# Patient Record
(demographics unavailable — no encounter records)

---

## 2024-10-30 NOTE — HISTORY OF PRESENT ILLNESS
[Mother] : mother [Breast milk] : breast milk [Formula ___ oz/feed] : [unfilled] oz of formula per feed [Normal] : Normal [Frequency of stools: ___] : Frequency of stools: [unfilled]  stools [In Bassinet/Crib] : sleeps in bassinet/crib [On back] : sleeps on back [Sleeps 12-16 hours per 24 hours (including naps)] : sleeps 12-16 hours per 24 hours (including naps) [Tummy time] : tummy time [No] : No cigarette smoke exposure [Rear facing car seat in back seat] : Rear facing car seat in back seat [Carbon Monoxide Detectors] : Carbon monoxide detectors at home [Smoke Detectors] : Smoke detectors at home. [NO] : No [Co-sleeping] : no co-sleeping [Loose bedding, pillow, toys, and/or bumpers in crib] : no loose bedding, pillow, toys, and/or bumpers in crib [Exposure to electronic nicotine delivery system] : No exposure to electronic nicotine delivery system [de-identified] : Taking 6 oz at a time; reflux has improved on the AR [FreeTextEntry3] : Sleeps between 9pm-5am, one bottle and then back to sleep.

## 2024-10-30 NOTE — PHYSICAL EXAM
[Alert] : alert [Crying] : crying [Normocephalic] : normocephalic [Flat Open Anterior [place]] : flat open anterior fontanelle [Red Reflex] : red reflex bilateral [PERRL] : PERRL [Normally Placed Ears] : normally placed ears [Auricles Well Formed] : auricles well formed [Clear Tympanic membranes] : clear tympanic membranes [Light reflex present] : light reflex present [Bony landmarks visible] : bony landmarks visible [Nares Patent] : nares patent [Palate Intact] : palate intact [Uvula Midline] : uvula midline [Symmetric Chest Rise] : symmetric chest rise [Clear to Auscultation Bilaterally] : clear to auscultation bilaterally [Regular Rate and Rhythm] : regular rate and rhythm [S1, S2 present] : S1, S2 present [+2 Femoral Pulses] : (+) 2 femoral pulses [Soft] : soft [Bowel Sounds] : bowel sounds present [Circumcised] : circumcised [Central Urethral Opening] : central urethral opening [Testicles Descended] : testicles descended bilaterally [Patent] : patent [Normally Placed] : normally placed [No Abnormal Lymph Nodes Palpated] : no abnormal lymph nodes palpated [Startle Reflex] : startle reflex present [Plantar Grasp] : plantar grasp reflex present [Symmetric Bryan] : symmetric bryan [Dermal Melanocytosis] : Dermal Melanocytosis [Acute Distress] : no acute distress [Discharge] : no discharge [Palpable Masses] : no palpable masses [Murmurs] : no murmurs [Tender] : nontender [Distended] : nondistended [Hepatomegaly] : no hepatomegaly [Splenomegaly] : no splenomegaly [Silva-Ortolani] : negative Silva-Ortolani [Allis Sign] : negative Allis sign [Spinal Dimple] : no spinal dimple [Tuft of Hair] : no tuft of hair [Rash or Lesions] : no rash/lesions [FreeTextEntry9] : Umbilical hernia reducible

## 2024-10-30 NOTE — PHYSICAL EXAM
[Alert] : alert [Crying] : crying [Normocephalic] : normocephalic [Flat Open Anterior [place]] : flat open anterior fontanelle [Red Reflex] : red reflex bilateral [PERRL] : PERRL [Normally Placed Ears] : normally placed ears [Auricles Well Formed] : auricles well formed [Clear Tympanic membranes] : clear tympanic membranes [Light reflex present] : light reflex present [Bony landmarks visible] : bony landmarks visible [Nares Patent] : nares patent [Palate Intact] : palate intact [Uvula Midline] : uvula midline [Symmetric Chest Rise] : symmetric chest rise [Clear to Auscultation Bilaterally] : clear to auscultation bilaterally [Regular Rate and Rhythm] : regular rate and rhythm [S1, S2 present] : S1, S2 present [+2 Femoral Pulses] : (+) 2 femoral pulses [Soft] : soft [Bowel Sounds] : bowel sounds present [Circumcised] : circumcised [Central Urethral Opening] : central urethral opening [Testicles Descended] : testicles descended bilaterally [Patent] : patent [Normally Placed] : normally placed [No Abnormal Lymph Nodes Palpated] : no abnormal lymph nodes palpated [Startle Reflex] : startle reflex present [Plantar Grasp] : plantar grasp reflex present [Symmetric Bryan] : symmetric bryan [Dermal Melanocytosis] : Dermal Melanocytosis [Acute Distress] : no acute distress [Discharge] : no discharge [Palpable Masses] : no palpable masses [Murmurs] : no murmurs [Tender] : nontender [Distended] : nondistended [Hepatomegaly] : no hepatomegaly [Splenomegaly] : no splenomegaly [Silva-Ortolani] : negative Silva-Ortolani [Allis Sign] : negative Allis sign [Spinal Dimple] : no spinal dimple [Tuft of Hair] : no tuft of hair [Rash or Lesions] : no rash/lesions [FreeTextEntry9] : Umbilical hernia reducible

## 2024-10-30 NOTE — HISTORY OF PRESENT ILLNESS
[Mother] : mother [Breast milk] : breast milk [Formula ___ oz/feed] : [unfilled] oz of formula per feed [Normal] : Normal [Frequency of stools: ___] : Frequency of stools: [unfilled]  stools [In Bassinet/Crib] : sleeps in bassinet/crib [On back] : sleeps on back [Sleeps 12-16 hours per 24 hours (including naps)] : sleeps 12-16 hours per 24 hours (including naps) [Tummy time] : tummy time [No] : No cigarette smoke exposure [Rear facing car seat in back seat] : Rear facing car seat in back seat [Carbon Monoxide Detectors] : Carbon monoxide detectors at home [Smoke Detectors] : Smoke detectors at home. [NO] : No [Co-sleeping] : no co-sleeping [Loose bedding, pillow, toys, and/or bumpers in crib] : no loose bedding, pillow, toys, and/or bumpers in crib [Exposure to electronic nicotine delivery system] : No exposure to electronic nicotine delivery system [de-identified] : Taking 6 oz at a time; reflux has improved on the AR [FreeTextEntry3] : Sleeps between 9pm-5am, one bottle and then back to sleep.

## 2024-10-30 NOTE — DISCUSSION/SUMMARY
[Family Functioning] : family functioning [Nutritional Adequacy and Growth] : nutritional adequacy and growth [Infant Development] : infant development [Oral Health] : oral health [Safety] : safety [Mother] : mother [FreeTextEntry1] :  4 month old M here for [facility]. PE wnl. Appropriate growth and development.   Plan: - Recommend breastfeeding, 8-12 feedings per day; other should continue prenatal vitamins and avoid alcohol - If formula is needed, recommend iron-fortified formulations, 2-4 oz every 3-4 hrs - Cereal may be introduced using a spoon and bowl - When in car, patient should be in rear-facing car seat in back seat - Put infant to sleep on back, in own crib with no loose or soft bedding. Lower crib mattress - Help infant to maintain sleep and feeding routines. May offer pacifier if needed - Continue tummy time when awake, increase as tolerated - Beyfortus, Pentacel, Prevnar, and Rotavirus today - Return in 2 months for [facility]  [] : The components of the vaccine(s) to be administered today are listed in the plan of care. The disease(s) for which the vaccine(s) are intended to prevent and the risks have been discussed with the caretaker.  The risks are also included in the appropriate vaccination information statements which have been provided to the patient's caregiver.  The caregiver has given consent to vaccinate.

## 2024-10-30 NOTE — DISCUSSION/SUMMARY
[Family Functioning] : family functioning [Nutritional Adequacy and Growth] : nutritional adequacy and growth [Infant Development] : infant development [Oral Health] : oral health [Safety] : safety [Mother] : mother [] : The components of the vaccine(s) to be administered today are listed in the plan of care. The disease(s) for which the vaccine(s) are intended to prevent and the risks have been discussed with the caretaker.  The risks are also included in the appropriate vaccination information statements which have been provided to the patient's caregiver.  The caregiver has given consent to vaccinate. [FreeTextEntry1] :  4 month old M here for [facility]. PE wnl. Appropriate growth and development.   Plan: - Recommend breastfeeding, 8-12 feedings per day; other should continue prenatal vitamins and avoid alcohol - If formula is needed, recommend iron-fortified formulations, 2-4 oz every 3-4 hrs - Cereal may be introduced using a spoon and bowl - When in car, patient should be in rear-facing car seat in back seat - Put infant to sleep on back, in own crib with no loose or soft bedding. Lower crib mattress - Help infant to maintain sleep and feeding routines. May offer pacifier if needed - Continue tummy time when awake, increase as tolerated - Beyfortus, Pentacel, Prevnar, and Rotavirus today - Return in 2 months for [facility]

## 2025-01-15 NOTE — HISTORY OF PRESENT ILLNESS
[Mother] : mother [Formula ___ oz/feed] : [unfilled] oz of formula per feed [Fruits] : fruits [Vegetables] : vegetables [Egg] : egg [Fish] : fish [Normal] : Normal [Frequency of stools: ___] : Frequency of stools: [unfilled]  stools [In Bassinet/Crib] : sleeps in bassinet/crib [On back] : sleeps on back [Sleeps 12-16 hours per 24 hours (including naps)] : sleeps 12-16 hours per 24 hours (including naps) [Tummy time] : tummy time [No] : No cigarette smoke exposure [Rear facing car seat in back seat] : Rear facing car seat in back seat [Carbon Monoxide Detectors] : Carbon monoxide detectors at home [Smoke Detectors] : Smoke detectors at home. [NO] : No [Peanut] : no peanut [Co-sleeping] : no co-sleeping [Loose bedding, pillow, toys, and/or bumpers in crib] : no loose bedding, pillow, toys, and/or bumpers in crib [Exposure to electronic nicotine delivery system] : No exposure to electronic nicotine delivery system [de-identified] : Started solids, doing well

## 2025-01-15 NOTE — DISCUSSION/SUMMARY
[Family Functioning] : family functioning [Nutrition and Feeding] : nutrition and feeding [Infant Development] : infant development [Oral Health] : oral health [Safety] : safety [Mother] : mother [] : The components of the vaccine(s) to be administered today are listed in the plan of care. The disease(s) for which the vaccine(s) are intended to prevent and the risks have been discussed with the caretaker.  The risks are also included in the appropriate vaccination information statements which have been provided to the patient's caregiver.  The caregiver has given consent to vaccinate. [FreeTextEntry1] :  6 month M presenting for a WCC. PE and vitals are wnl. Appropriate growth and development. Pulling to stand and crawling.  Recommend breastfeeding, 8-12 feedings per day. If formula is needed, 2-4 oz every 3-4 hrs. Introduce single-ingredient foods rich in iron, one at a time. Incorporate up to 4 oz of fluorinated water daily in a sippy cup. When teeth erupt wipe daily with washcloth. When in car, patient should be in rear-facing car seat in back seat. Put baby to sleep on back, in own crib with no loose or soft bedding. Lower crib mattress. Help baby to maintain sleep and feeding routines. May offer pacifier if needed. Continue tummy time when awake. Ensure home is safe since baby is now more mobile. Do not use infant walker. Read aloud to baby. First Flu, Pentacel, Prevnar, and Rotavirus today Return in 3 months for WCC Return in 1 month for 2nd Flu

## 2025-01-15 NOTE — DEVELOPMENTAL MILESTONES
[Normal Development] : Normal Development [None] : none [Pats or smiles at reflection] : pats or smiles at reflection [Begins to turn when name called] : begins to turn when name called [Babbles] : babbles [Rolls over prone to supine] : rolls over prone to supine [Sits briefly without support] : sits briefly without support [Reaches for object and transfers] : reaches for object and transfers [Rakes small object with 4 fingers] : rakes small object with 4 fingers [[place] small object on surface] : bangs small object on surface [Passed] : passed [FreeTextEntry1] : Started crawling, he is pulling to stand

## 2025-01-15 NOTE — PHYSICAL EXAM
[Alert] : alert [Playful] : playful [Normocephalic] : normocephalic [Flat Open Anterior [place]] : flat open anterior fontanelle [Red Reflex] : red reflex bilateral [PERRL] : PERRL [Normally Placed Ears] : normally placed ears [Auricles Well Formed] : auricles well formed [Clear Tympanic membranes] : clear tympanic membranes [Light reflex present] : light reflex present [Bony landmarks visible] : bony landmarks visible [Nares Patent] : nares patent [Palate Intact] : palate intact [Uvula Midline] : uvula midline [Supple, full passive range of motion] : supple, full passive range of motion [Symmetric Chest Rise] : symmetric chest rise [Clear to Auscultation Bilaterally] : clear to auscultation bilaterally [Regular Rate and Rhythm] : regular rate and rhythm [S1, S2 present] : S1, S2 present [+2 Femoral Pulses] : (+) 2 femoral pulses [Soft] : soft [Bowel Sounds] : bowel sounds present [Circumcised] : circumcised [Central Urethral Opening] : central urethral opening [Testicles Descended] : testicles descended bilaterally [Patent] : patent [Normally Placed] : normally placed [No Abnormal Lymph Nodes Palpated] : no abnormal lymph nodes palpated [Symmetric Buttocks Creases] : symmetric buttocks creases [Plantar Grasp] : plantar grasp reflex present [Cranial Nerves Grossly Intact] : cranial nerves grossly intact [Acute Distress] : no acute distress [Discharge] : no discharge [Tooth Eruption] : no tooth eruption [Palpable Masses] : no palpable masses [Murmurs] : no murmurs [Tender] : nontender [Distended] : nondistended [Hepatomegaly] : no hepatomegaly [Splenomegaly] : no splenomegaly [Silva-Ortolani] : negative Silva-Ortolani [Allis Sign] : negative Allis sign [Spinal Dimple] : no spinal dimple [Tuft of Hair] : no tuft of hair [Rash or Lesions] : no rash/lesions [de-identified] : Umbilical hernia present, reducible

## 2025-03-19 NOTE — HISTORY OF PRESENT ILLNESS
[Parents] : parents [Formula ___ oz/feed] : [unfilled] oz of formula per feed [Fruit] : fruit [Vegetables] : vegetables [Eggs] : eggs [Fish] : fish [Water] : water [Normal] : Normal [Frequency of stools: ___] : Frequency of stools: [unfilled]  stools [In Crib] : sleeps in crib [On back] : sleeps on back [Sleeps 12-16 hours per 24 hours (including naps)] : sleeps 12-16 hours per 24 hours (including naps) [Pacifier use] : Pacifier use [Brushing teeth] : brushing teeth [Toothpaste] : Primary Fluoride Source: Toothpaste [No] : No exposure to electronic nicotine device [Water heater temperature set at <120 degrees F] : Water heater temperature set at <120 degrees F [Rear facing car seat in  back seat] : Rear facing car seat in  back seat [Carbon Monoxide Detectors] : Carbon monoxide detectors [Smoke Detectors] : Smoke detectors [Up to date] : Up to date [NO] : No [Peanut] : no peanut [Co-sleeping] : no co-sleeping [Wakes up at night] : does not wake up at night [Loose bedding, pillow, toys, and/or bumpers in crib] : no loose bedding, pillow, toys, and/or bumpers in crib [de-identified] : Good eater, likes table food. Drinking water well.  [de-identified] : Home

## 2025-03-19 NOTE — DISCUSSION/SUMMARY
[Family Adaptation] : family adaptation [Infant [place]] : infant independence [Feeding Routine] : feeding routine [Safety] : safety [Mother] : mother [Father] : father [] : The components of the vaccine(s) to be administered today are listed in the plan of care. The disease(s) for which the vaccine(s) are intended to prevent and the risks have been discussed with the caretaker.  The risks are also included in the appropriate vaccination information statements which have been provided to the patient's caregiver.  The caregiver has given consent to vaccinate. [FreeTextEntry1] :  9 month old M here for [facility]. PE wnl. Appropriate growth and development.   Plan: - Continue breastmilk or formula as desired. Increase table foods, 3 meals with 2-3 snacks per day -  Incorporate up to 6 oz of fluorinated water daily in a sippy cup - Discussed weaning of bottle and pacifier - Wipe teeth daily with washcloth; can use sprinkle sized amount of toothpaste - When in car, patient should be in rear-facing car seat in back seat - Put infant to sleep in own crib with no loose or soft bedding. Lower crib mattress - Help infant to maintain consistent daily routines and sleep schedule - Recognize stranger anxiety - Ensure home is safe since infant is increasingly mobile. Be within arm's reach of infant at all times Use consistent, positive discipline - Avoid screen time; read aloud to infant - Hepatitis B and 2nd flu today   - Return in 3 months after 1 yr birthday

## 2025-03-19 NOTE — DEVELOPMENTAL MILESTONES
[Normal Development] : Normal Development [None] : none [Uses basic gestures] : uses basic gestures [Says "Ervin" or "Mama"] : says "Ervin" or "Mama" nonspecifically [Sits well without support] : sits well without support [Transitions between sitting and lying] : transitions between sitting and lying [Balances on hands and knees] : balances on hands and knees [Crawls] : crawls [Picks up small objects with 3 fingers] : picks up small objects with 3 fingers and thumb [Releases objects intentionally] : releases objects intentionally [[place] objects together] : bangs objects together [Yes] : Completed.

## 2025-03-19 NOTE — PHYSICAL EXAM
[Alert] : alert [Playful] : playful [Normocephalic] : normocephalic [Flat Open Anterior [place]] : flat open anterior fontanelle [Red Reflex] : red reflex bilateral [PERRL] : PERRL [Normally Placed Ears] : normally placed ears [Auricles Well Formed] : auricles well formed [Clear Tympanic membranes] : clear tympanic membranes [Light reflex present] : light reflex present [Bony landmarks visible] : bony landmarks visible [Nares Patent] : nares patent [Palate Intact] : palate intact [Uvula Midline] : uvula midline [Supple, full passive range of motion] : supple, full passive range of motion [Symmetric Chest Rise] : symmetric chest rise [Clear to Auscultation Bilaterally] : clear to auscultation bilaterally [Regular Rate and Rhythm] : regular rate and rhythm [S1, S2 present] : S1, S2 present [+2 Femoral Pulses] : (+) 2 femoral pulses [Soft] : soft [Bowel Sounds] : bowel sounds present [Circumcised] : circumcised [Central Urethral Opening] : central urethral opening [Testicles Descended] : testicles descended bilaterally [No Abnormal Lymph Nodes Palpated] : no abnormal lymph nodes palpated [Symmetric Abduction and Rotation of hips] : symmetric abduction and rotation of hips [Straight] : straight [Cranial Nerves Grossly Intact] : cranial nerves grossly intact [Acute Distress] : no acute distress [Excessive Tearing] : no excessive tearing [Discharge] : no discharge [Palpable Masses] : no palpable masses [Murmurs] : no murmurs [Tender] : nontender [Distended] : nondistended [Hepatomegaly] : no hepatomegaly [Splenomegaly] : no splenomegaly [Allis Sign] : negative Allis sign [Rash or Lesions] : no rash/lesions [de-identified] : Small umbilical hernia

## 2025-06-19 NOTE — DISCUSSION/SUMMARY
[Family Support] : family support [Establishing Routines] : establishing routines [Feeding and Appetite Changes] : feeding and appetite changes [Establishing A Dental Home] : establishing a dental home [Safety] : safety [Mother] : mother [] : The components of the vaccine(s) to be administered today are listed in the plan of care. The disease(s) for which the vaccine(s) are intended to prevent and the risks have been discussed with the caretaker.  The risks are also included in the appropriate vaccination information statements which have been provided to the patient's caregiver.  The caregiver has given consent to vaccinate. [FreeTextEntry1] :  12 month old M presenting for a WCC. Vitals and PE wnl. Lead negative and Hgb wnl. Appropriate growth and development for age. Amblyopia screen no risk factors.   Plan: - Transition to whole cow's milk - Continue table foods, 3 meals with 2-3 snacks per day - Incorporate up to 6 oz of fluorinated water daily in a Sippy cup - Brush teeth twice a day with soft toothbrush. Recommend visit to dentist - When in car, keep child in rear-facing car seats until age 2, or until the maximum height and weight for seat is reached - Infant should sleep in own space. Strive to maintain consistent daily routines and sleep schedule - Ensure home is safe since baby is increasingly mobile - Avoid screen time - Read aloud to infant - Return in 3 months for 15 month WCC - MMR and Varicella today

## 2025-06-19 NOTE — DEVELOPMENTAL MILESTONES
[Normal Development] : Normal Development [None] : none [Looks for hidden objects] : looks for hidden objects [Imitates new gestures] : imitates new gestures [Says "Dad" or "Mom" with meaning] : says "Dad" or "Mom" with meaning [Uses one word other than Mom or] : uses one word other than Mom or Dad or personal names [Follows a verbal command that] : follows a verbal command that includes a gesture [Takes first independent] : takes first independent steps [Stands without support] : stands without support [Drops object in a cup] : drops object in a cup [Picks up small object with 2 finger] : picks up small object with 2 finger pincer grasp [Picks up food and eats it] : picks up food and eats it [FreeTextEntry1] : Says no, mm for yum

## 2025-06-19 NOTE — HISTORY OF PRESENT ILLNESS
[Mother] : mother [Formula ___ oz/feed] : [unfilled] oz of formula per feed [Cow's milk ___ oz/feed] : [unfilled] oz of Cow's milk per feed [Fruit] : fruit [Vegetables] : vegetables [Meat] : meat [Dairy] : dairy [Baby food] : baby food [Table food] : table food [___ stools per day] : [unfilled]  stools per day [Normal] : Normal [Brushing teeth] : Brushing teeth [Tap water] : Primary Fluoride Source: Tap water [Playtime] : Playtime  [No] : No cigarette smoke exposure [Water heater temperature set at <120 degrees F] : Water heater temperature set at <120 degrees F [Car seat in back seat] : Car seat in back seat [Smoke Detectors] : Smoke detectors [Exposure to electronic nicotine delivery system] : No exposure to electronic nicotine delivery system [Carbon Monoxide Detectors] : Carbon monoxide detectors [Up to date] : Up to date [de-identified] : Rash of face, sometimes very raw. Moisturizer x3 a day.  [de-identified] : Good eater.  [FreeTextEntry9] : HOME

## 2025-06-19 NOTE — PHYSICAL EXAM
[Alert] : alert [Playful] : playful [Normocephalic] : normocephalic [Closed Anterior [place]] : closed anterior fontanelle [Red Reflex] : red reflex bilateral [PERRL] : PERRL [Normally Placed Ears] : normally placed ears [Auricles Well Formed] : auricles well formed [Clear Tympanic membranes] : clear tympanic membranes [Light reflex present] : light reflex present [Bony landmarks visible] : bony landmarks visible [Nares Patent] : nares patent [Palate Intact] : palate intact [Uvula Midline] : uvula midline [Tooth Eruption] : tooth eruption [Supple, full passive range of motion] : supple, full passive range of motion [Symmetric Chest Rise] : symmetric chest rise [Clear to Auscultation Bilaterally] : clear to auscultation bilaterally [Regular Rate and Rhythm] : regular rate and rhythm [S1, S2 present] : S1, S2 present [+2 Femoral Pulses] : (+) 2 femoral pulses [Soft] : soft [Bowel Sounds] : normoactive bowel sounds [Circumcised] : circumcised [Central Urethral Opening] : central urethral opening [Testicles Descended] : testicles descended bilaterally [No Abnormal Lymph Nodes Palpated] : no abnormal lymph nodes palpated [Symmetric Abduction and Rotation of Hips] : symmetric abduction and rotation of hips [Straight] : straight [Cranial Nerves Grossly Intact] : cranial nerves grossly intact [Discharge] : no discharge [Palpable Masses] : no palpable masses [Murmurs] : no murmurs [Tender] : nontender [Distended] : nondistended [Hepatomegaly] : no hepatomegaly [Splenomegaly] : no splenomegaly [Allis Sign] : negative Allis sign [Rash or Lesions] : no rash/lesions [de-identified] : Umbilical hernia